# Patient Record
Sex: FEMALE | Race: WHITE
[De-identification: names, ages, dates, MRNs, and addresses within clinical notes are randomized per-mention and may not be internally consistent; named-entity substitution may affect disease eponyms.]

---

## 2017-02-20 ENCOUNTER — HOSPITAL ENCOUNTER (OUTPATIENT)
Dept: HOSPITAL 45 - C.PAPS | Age: 77
Discharge: HOME | End: 2017-02-20
Attending: OBSTETRICS & GYNECOLOGY
Payer: COMMERCIAL

## 2017-02-20 DIAGNOSIS — Z12.4: Primary | ICD-10-CM

## 2017-03-02 ENCOUNTER — HOSPITAL ENCOUNTER (OUTPATIENT)
Dept: HOSPITAL 45 - C.MAMM | Age: 77
Discharge: HOME | End: 2017-03-02
Attending: OBSTETRICS & GYNECOLOGY
Payer: COMMERCIAL

## 2017-03-02 DIAGNOSIS — Z12.31: Primary | ICD-10-CM

## 2017-03-02 NOTE — MAMMOGRAPHY REPORT
BILATERAL DIGITAL SCREENING MAMMOGRAM WITH CAD: 3/2/2017

CLINICAL HISTORY: Routine screening.  Patient has no complaints.  





TECHNIQUE:  Current study was also evaluated with a Computer Aided Detection (CAD) system.  Bilatera
l CC and MLO views were obtained.



COMPARISON: Comparison is made to exams dated:  2/29/2016 mammogram, 2/27/2015 mammogram, 2/26/2014 
mammogram, 2/25/2013 mammogram, 2/20/2012 mammogram, and 2/17/2011 mammogram - [facility].   



BREAST COMPOSITION:  There are scattered areas of fibroglandular density in both breasts.  



FINDINGS:  No suspicious masses, calcifications, or areas of architectural distortion are noted in e
ither breast. There has been no significant interval change compared to prior exams.  



IMPRESSION:  ACR BI-RADS CATEGORY 1: NEGATIVE

There is no mammographic evidence of malignancy. A 1 year screening mammogram is recommended.  The p
atient will receive written notification of the results.  





Approximately 10% of breast cancers are not detected with mammography. A negative mammographic repor
t should not delay biopsy if a clinically suggestive mass is present.



Alicja Morley M.D.          

ah/:3/2/2017 14:13:01  



Imaging Technologist: Rashida HAMEEDR, M, [facility]

letter sent: Normal 1/2  

BI-RADS Code: ACR BI-RADS Category 1: Negative

## 2017-03-30 ENCOUNTER — HOSPITAL ENCOUNTER (EMERGENCY)
Dept: HOSPITAL 45 - C.EDC | Age: 77
Discharge: HOME | End: 2017-03-30
Payer: COMMERCIAL

## 2017-03-30 VITALS — SYSTOLIC BLOOD PRESSURE: 137 MMHG | HEART RATE: 72 BPM | DIASTOLIC BLOOD PRESSURE: 73 MMHG | OXYGEN SATURATION: 100 %

## 2017-03-30 VITALS — TEMPERATURE: 97.88 F

## 2017-03-30 VITALS
HEIGHT: 65 IN | HEIGHT: 65 IN | WEIGHT: 121.25 LBS | WEIGHT: 121.25 LBS | BODY MASS INDEX: 20.2 KG/M2 | BODY MASS INDEX: 20.2 KG/M2

## 2017-03-30 DIAGNOSIS — R55: Primary | ICD-10-CM

## 2017-03-30 DIAGNOSIS — Z79.899: ICD-10-CM

## 2017-03-30 LAB
ALP SERPL-CCNC: 125 U/L (ref 45–117)
ALT SERPL-CCNC: 33 U/L (ref 12–78)
ANION GAP SERPL CALC-SCNC: 9 MMOL/L (ref 3–11)
APPEARANCE UR: CLEAR
AST SERPL-CCNC: 30 U/L (ref 15–37)
BASOPHILS # BLD: 0.08 K/UL (ref 0–0.2)
BASOPHILS NFR BLD: 1.4 %
BILIRUB UR-MCNC: (no result) MG/DL
BUN SERPL-MCNC: 12 MG/DL (ref 7–18)
BUN/CREAT SERPL: 13.8 (ref 10–20)
CALCIUM SERPL-MCNC: 9.5 MG/DL (ref 8.5–10.1)
CHLORIDE SERPL-SCNC: 104 MMOL/L (ref 98–107)
CKMB/CK RATIO: 1.7 (ref 0–3)
CO2 SERPL-SCNC: 29 MMOL/L (ref 21–32)
COLOR UR: YELLOW
COMPLETE: YES
CREAT CL PREDICTED SERPL C-G-VRATE: 46.7 ML/MIN
CREAT SERPL-MCNC: 0.89 MG/DL (ref 0.6–1.2)
EOSINOPHIL NFR BLD AUTO: 297 K/UL (ref 130–400)
GLUCOSE SERPL-MCNC: 109 MG/DL (ref 70–99)
HCT VFR BLD CALC: 43.4 % (ref 37–47)
IG%: 0.2 %
IMM GRANULOCYTES NFR BLD AUTO: 41.8 %
INR PPP: 1 (ref 0.9–1.1)
LYMPHOCYTES # BLD: 2.46 K/UL (ref 1.2–3.4)
MAGNESIUM SERPL-MCNC: 2.6 MG/DL (ref 1.8–2.4)
MANUAL MICROSCOPIC REQUIRED?: NO
MCH RBC QN AUTO: 30.4 PG (ref 25–34)
MCHC RBC AUTO-ENTMCNC: 32.3 G/DL (ref 32–36)
MCV RBC AUTO: 94.3 FL (ref 80–100)
MONOCYTES NFR BLD: 9.2 %
NEUTROPHILS # BLD AUTO: 7.1 %
NEUTROPHILS NFR BLD AUTO: 40.3 %
NITRITE UR QL STRIP: (no result)
PARTIAL THROMBOPLASTIN RATIO: 0.9
PH UR STRIP: 7 [PH] (ref 4.5–7.5)
PMV BLD AUTO: 10.1 FL (ref 7.4–10.4)
POTASSIUM SERPL-SCNC: 3.7 MMOL/L (ref 3.5–5.1)
PROTHROMBIN TIME: 10.7 SECONDS (ref 9–12)
RBC # BLD AUTO: 4.6 M/UL (ref 4.2–5.4)
REVIEW REQ?: NO
SODIUM SERPL-SCNC: 142 MMOL/L (ref 136–145)
SP GR UR STRIP: 1.01 (ref 1–1.03)
URINE BILL WITH OR WITHOUT MIC: (no result)
UROBILINOGEN UR-MCNC: (no result) MG/DL
WBC # BLD AUTO: 5.89 K/UL (ref 4.8–10.8)

## 2017-03-30 NOTE — DIAGNOSTIC IMAGING REPORT
CHEST ONE VIEW PORTABLE



CLINICAL HISTORY: EVALUATE WEAKNESS dyspnea



COMPARISON STUDY:  2/26/2016



FINDINGS: The bones soft tissues and hemidiaphragms are normal. The

cardiomediastinal silhouette is normal. The lungs are clear. The pulmonary

vasculature is normal. Mild stable emphysematous change



IMPRESSION:  Mild emphysematous change. No acute process. 









Electronically signed by:  Prateek Watson M.D.

3/30/2017 11:00 AM



Dictated Date/Time:  3/30/2017 11:00 AM

## 2017-03-30 NOTE — DIAGNOSTIC IMAGING REPORT
HEAD CT NONCONTRAST



CT DOSE: 638.56 mGycm



HISTORY: Mental status change  syncope



TECHNIQUE: Multiaxial CT images of the head were performed without the use of

intravenous contrast.



Comparison: None.



Findings: The paranasal sinuses and mastoid air cells are clear. The calvarium

and skull base are intact. The ventricles and sulci are within normal limits.

There is no mass, hematoma, midline shift, or acute infarct.



Impression:

No acute intracranial abnormality. 







Electronically signed by:  Prateek Watson M.D.

3/30/2017 11:30 AM



Dictated Date/Time:  3/30/2017 11:29 AM

## 2017-03-30 NOTE — EMERGENCY ROOM VISIT NOTE
History


Report prepared by Charito:  Jessy Wright


Under the Supervision of:  Dr. Jeovany Krueger M.D.


First contact with patient:  10:40


Stated Complaint:  SYNCOPE





History of Present Illness


The patient is a 77 year old female who presents to the Emergency Room with 

complaints of a sudden syncopal episode that occurred prior to arrival.  The 

patient states that she was at the [employer] this morning when she started to 

feel that she needed to have a bowel movement.  She states that she had a one 

minute warning and states that she lost consciousness.  The patient states that 

she was told that she was unconscious for 20 seconds.  She states that she woke 

this morning feeling lightheaded.  The patient states that she felt nauseous 

after the episode, but states that it has now resolved.  She notes weakness to 

her extremities since the episode.  The patient states that three weeks ago she 

had an episode of vertigo.  Pt denies, headache, fevers, chills, diaphoresis, 

visual changes, neck pain, tearing pain radiating to the back, personal history 

or family history of aneurysm or pulmonary embolism, uncontrolled hypertension, 

breathing difficulties, leg swelling, coagulation abnormalities, prolonged 

travel, recent surgery or immobilization, vomiting, abdominal pain, melena, 

hematochezia, urinary symptoms, numbness, lymphadenopathy, rash, or other 

complaints.





   Source of History:  patient


   Onset:  prior to arrival


   Position:  other (global)


   Quality:  other (syncopal episode)


   Timing:  other (sudden)


   Associated Symptoms:  + LOC, + nausea


Note:


Associated Symptoms: lightheadedness





Review of Systems


See HPI for pertinent positives and negatives.  A total of ten systems were 

reviewed and were otherwise negative.





Past Medical & Surgical


Medical Problems:


(1) Acute pancreatitis


(2) Bullous pemphigoid


(3) Clostridium difficile colitis


(4) Diverticulosis Colon (W/O Ment Of Hemorrhage)


(5) Nontox Multinodul Goiter


(6) Right Hip DJD








Family History





No pertinent family history





Social History


Smoking Status:  Never Smoker


Marital Status:  


Housing Status:  lives with family





Current/Historical Medications


Scheduled


Pancrelipase (Lipase-Protease- (Creon 89016), 1 CAP PO BID


Rizatriptan Benzoate (Maxalt), 10 MG PO PRN





Scheduled PRN


Docusate Sodium (Docusate Sodium), 1 CAP PO QAM PRN for PRN





Allergies


Coded Allergies:  


     Doxycycline (Verified  Allergy, Unknown, DEVELOPED PANCREATITIS, 3/30/17)


     Clindamycin (Verified  Adverse Reaction, Severe, unkown, 3/30/17)


 pt states causes c-diff


     Pregabalin (Verified  Adverse Reaction, Intermediate, Hallucinations, 3/30/

17)





Physical Exam


Vital Signs











  Date Time  Temp Pulse Resp B/P Pulse Ox O2 Delivery O2 Flow Rate FiO2


 


3/30/17 14:30  72 18 137/73 100 Room Air  


 


3/30/17 13:28  64      


 


3/30/17 12:31  70 18 165/80 98 Room Air  


 


3/30/17 11:37  74  134/75    





  78  117/67    





  77  121/74    


 


3/30/17 10:44 36.6 75 18 123/71 99 Room Air  


 


3/30/17 10:44  72      











Physical Exam


GENERAL: Awake, alert, well-appearing, in no distress


HENT: Normocephalic, atraumatic. Oropharynx unremarkable.


EYES: Normal conjunctiva. Sclera non-icteric.


NECK: Supple. No nuchal rigidity. FROM. No JVD.


RESPIRATORY: Clear to auscultation.


CARDIAC: Regular rate, normal rhythm. Extremities warm and well perfused. 

Pulses equal.


ABDOMEN: Soft, non-distended. No tenderness to palpation. No rebound or 

guarding. No masses.


RECTAL: Deferred.


MUSCULOSKELETAL: Chest examination reveals no tenderness. The back is 

symmetrical on inspection without obvious abnormality. There is no CVA 

tenderness to palpation. No joint edema. 


LOWER EXTREMITIES: Calves are equal size bilaterally and non-tender. No edema. 

No discoloration. 


NEURO: Normal sensorium. No sensory or motor deficits noted. Cranial nerves 

intact.


SKIN: No rash or jaundice noted.





Medical Decision & Procedures


ER Provider


Diagnostic Interpretation:


X ray results as stated below per my interpretation and radiologist 

interpretation. Other radiology results as stated below per my review and 

radiologist interpretation





CHEST ONE VIEW PORTABLE





CLINICAL HISTORY: EVALUATE WEAKNESS dyspnea





COMPARISON STUDY:  2/26/2016





FINDINGS: The bones soft tissues and hemidiaphragms are normal. The


cardiomediastinal silhouette is normal. The lungs are clear. The pulmonary


vasculature is normal. Mild stable emphysematous change





IMPRESSION:  Mild emphysematous change. No acute process. 





Electronically signed by:  Prateek Watson M.D.


3/30/2017 11:00 AM





Dictated Date/Time:  3/30/2017 11:00 AM





HEAD CT NONCONTRAST





CT DOSE: 638.56 mGycm





HISTORY: Mental status change  syncope





TECHNIQUE: Multiaxial CT images of the head were performed without the use of


intravenous contrast.





Comparison: None.





Findings: The paranasal sinuses and mastoid air cells are clear. The calvarium


and skull base are intact. The ventricles and sulci are within normal limits.


There is no mass, hematoma, midline shift, or acute infarct.





Impression:


No acute intracranial abnormality. 





Electronically signed by:  Prateek Watson M.D.


3/30/2017 11:30 AM





Dictated Date/Time:  3/30/2017 11:29 AM





Laboratory Results


3/30/17 10:14








Red Blood Count 4.60, Mean Corpuscular Volume 94.3, Mean Corpuscular Hemoglobin 

30.4, Mean Corpuscular Hemoglobin Concent 32.3, Mean Platelet Volume 10.1, 

Neutrophils (%) (Auto) 40.3, Lymphocytes (%) (Auto) 41.8, Monocytes (%) (Auto) 

9.2, Eosinophils (%) (Auto) 7.1, Basophils (%) (Auto) 1.4, Neutrophils # (Auto) 

2.38, Lymphocytes # (Auto) 2.46, Monocytes # (Auto) 0.54, Eosinophils # (Auto) 

0.42, Basophils # (Auto) 0.08





3/30/17 10:14

















Test


  3/30/17


10:14 3/30/17


13:00


 


White Blood Count


  5.89 K/uL


(4.8-10.8) 


 


 


Red Blood Count


  4.60 M/uL


(4.2-5.4) 


 


 


Hemoglobin


  14.0 g/dL


(12.0-16.0) 


 


 


Hematocrit 43.4 % (37-47)  


 


Mean Corpuscular Volume


  94.3 fL


() 


 


 


Mean Corpuscular Hemoglobin


  30.4 pg


(25-34) 


 


 


Mean Corpuscular Hemoglobin


Concent 32.3 g/dl


(32-36) 


 


 


Platelet Count


  297 K/uL


(130-400) 


 


 


Mean Platelet Volume


  10.1 fL


(7.4-10.4) 


 


 


Neutrophils (%) (Auto) 40.3 %  


 


Lymphocytes (%) (Auto) 41.8 %  


 


Monocytes (%) (Auto) 9.2 %  


 


Eosinophils (%) (Auto) 7.1 %  


 


Basophils (%) (Auto) 1.4 %  


 


Neutrophils # (Auto)


  2.38 K/uL


(1.4-6.5) 


 


 


Lymphocytes # (Auto)


  2.46 K/uL


(1.2-3.4) 


 


 


Monocytes # (Auto)


  0.54 K/uL


(0.11-0.59) 


 


 


Eosinophils # (Auto)


  0.42 K/uL


(0-0.5) 


 


 


Basophils # (Auto)


  0.08 K/uL


(0-0.2) 


 


 


RDW Standard Deviation


  45.0 fL


(36.4-46.3) 


 


 


RDW Coefficient of Variation


  13.0 %


(11.5-14.5) 


 


 


Immature Granulocyte % (Auto) 0.2 %  


 


Immature Granulocyte # (Auto)


  0.01 K/uL


(0.00-0.02) 


 


 


Prothrombin Time


  10.7 SECONDS


(9.0-12.0) 


 


 


Prothromb Time International


Ratio 1.0 (0.9-1.1) 


  


 


 


Activated Partial


Thromboplast Time 22.6 SECONDS


(21.0-31.0) 


 


 


Partial Thromboplastin Ratio 0.9  


 


Anion Gap


  9.0 mmol/L


(3-11) 


 


 


Est Creatinine Clear Calc


Drug Dose 46.7 ml/min 


  


 


 


Estimated GFR (


American) 73.0 


  


 


 


Estimated GFR (Non-


American 63.0 


  


 


 


BUN/Creatinine Ratio 13.8 (10-20)  


 


Calcium Level


  9.5 mg/dl


(8.5-10.1) 


 


 


Magnesium Level


  2.6 mg/dl


(1.8-2.4) 


 


 


Total Bilirubin


  0.6 mg/dl


(0.2-1) 


 


 


Direct Bilirubin


  0.1 mg/dl


(0-0.2) 


 


 


Aspartate Amino Transf


(AST/SGOT) 30 U/L (15-37) 


  


 


 


Alanine Aminotransferase


(ALT/SGPT) 33 U/L (12-78) 


  


 


 


Alkaline Phosphatase


  125 U/L


() 


 


 


Total Creatine Kinase


  59 U/L


() 


 


 


Creatine Kinase MB


  1.0 ng/ml


(0.5-3.6) 


 


 


Creatine Kinase MB Ratio 1.7 (0-3.0)  


 


Troponin I


  < 0.015 ng/ml


(0-0.045) 


 


 


Total Protein


  7.0 gm/dl


(6.4-8.2) 


 


 


Albumin


  3.6 gm/dl


(3.4-5.0) 


 


 


Thyroid Stimulating Hormone


(TSH) 8.830 uIu/ml


(0.300-4.500) 


 


 


Urine Color  YELLOW 


 


Urine Appearance  CLEAR (CLEAR) 


 


Urine pH  7.0 (4.5-7.5) 


 


Urine Specific Gravity


  


  1.009


(1.000-1.030)


 


Urine Protein  NEG (NEG) 


 


Urine Glucose (UA)  NEG (NEG) 


 


Urine Ketones  NEG (NEG) 


 


Urine Occult Blood  NEG (NEG) 


 


Urine Nitrite  NEG (NEG) 


 


Urine Bilirubin  NEG (NEG) 


 


Urine Urobilinogen  NEG (NEG) 


 


Urine Leukocyte Esterase  NEG (NEG) 








Laboratory results reviewed by me





Medications Administered











 Medications


  (Trade)  Dose


 Ordered  Sig/Lissa


 Route  Start Time


 Stop Time Status Last Admin


Dose Admin


 


 Sodium Chloride  1,000 ml @ 


 125 mls/hr  Q8H STAT


 IV  3/30/17 10:40


 3/30/17 15:06 DC 3/30/17 10:40


125 MLS/HR


 


 Sodium Chloride


  (Nss 500ml)  500 ml @ 


 999 mls/hr  Q31M STAT


 IV  3/30/17 10:47


 3/30/17 11:17 DC 3/30/17 10:47


999 MLS/HR











ECG


Indication:  syncope


Rate (beats per minute):  69


Rhythm:  normal sinus


Findings:  no acute ischemic change, no ectopy





ED Course


1040: Ordered Sodium Chloride 1000 ml @ 125 mls/hr IV.





1043: The patient was evaluated in room C6. A complete history and physical 

exam was performed.





1047: Ordered Sodium Chloride 500 ml @ 999 mls/hr IV.





1352: I reevaluated the patient and she is currently asymptomatic.  I discussed 

all the exam findings with her.  She states that she has an appointment 

scheduled next week with her PCP.  She is going to have an ambulatory trial.





1415: Per nursing staff, the patient's ambulatory trial went well.  





1421: I reevaluated the patient and she is doing well.  I discussed the 

treatment plan with her and she verbalized complete understanding and 

agreement.  She is ready to go home.





Medical Decision


Prior records/ancillary studies reviewed.





Triage Nursing notes reviewed and agree them.





Additional history obtained from the family.





The patient's history was concerning for syncope.





Differential diagnosis:


Etiologies such as vasovagal event, infection, hypoglycemia, electrolyte 

abnormalities, cardiac sources, intracerebral event, toxicologic, neurologic, 

as well as others were entertained.  





Physical examination:


As above.  No focal findings.





ER treatment provided:


IV hydration with normal saline


On reassessment the patient felt completely better.





Diagnostics interpretation by me:


ECG: Unremarkable





The labs revealed the patient had an unremarkable CBC, chemistry panel, 

urinalysis and cardiac markers.  Her TSH is minimally elevated.  No findings 

that would be a cause of her episode.  





Imaging studies:


Chest x-ray and head CT as above





The patient notes a sensation about having a bowel movement and having some 

abdominal discomfort.  She then became lightheaded.  The patient then noted 

increasing symptoms to the point where she became even more lightheaded and 

passed out.  She woke up.  She has some mildly low blood pressure and some 

nausea afterwards.  She then felt completely normal.  She feels completely 

normal at this point time.  Orthostatic testing was negative.  Blood work and 

urinalysis were unremarkable.  The patient ambulated and felt great.  I 

discussed management options and the patient felt very comfortable with 

conservative management.  She has a follow-up appointment with her primary 

doctor next week.  She will rest and take it easy.  If she worsens in any way 

she will come back to the emergency from for reevaluation.  This seems to be 

most consistent with a vasovagal event. 





By the evaluation outlined above emergent etiologies such as  infection, 

hypoglycemia, electrolyte abnormalities, cardiac sources, intracerebral event, 

toxicologic, neurologic,as well as others were deemed relatively unlikely.  





The patient and  were informed about the findings as listed above.  All 

questions were answered and they were pleased with the treatment.  Return 

instructions were outlined and the patient was discharged in stable condition. 

   





Outpatient prescription management:


None





Referral:


The patient was referred back to her primary care physician for follow-up next 

week for a recheck of the current condition.











The chart was completed utilizing Dragon Speech voice recognition software.   

Grammatical errors, random word insertions, pronoun errors, and incomplete 

sentences are an occasional consequence of this system due to software 

limitations, ambient noise, and hardware issues.  Any formal questions or 

concerns about the content, text, or information contained within the body of 

this dictation should be directly addressed to the physician for clarification.





Impression





 Primary Impression:  


 Syncope





Scribe Attestation


The scribe's documentation has been prepared under my direction and personally 

reviewed by me in its entirety. I confirm that the note above accurately 

reflects all work, treatment, procedures, and medical decision making performed 

by me.





Departure Information


Dispostion


Home / Self-Care





Referrals


Pete De La Rosa M.D. (PCP)





Forms


HOME CARE DOCUMENTATION FORM,                                                 

               IMPORTANT VISIT INFORMATION





Patient Instructions


My [facility]





Additional Instructions





SYNCOPE INSTRUCTIONS:





Rest and drink plenty of fluids as tolerated.  


 


Continue current medications.





Return to the ER for passing out, chest pain, headache, persistent vomiting, 

fevers, abdominal pain, chest pains, difficulty breathing, black or bloody 

stools, worsening of your condition, or as needed.





Follow up with your primary physician as scheduled next week for a recheck of 

your current condition





Problem Qualifiers








 Primary Impression:  


 Syncope


 Syncope type:  vasovagal syncope  Qualified Codes:  R55 - Syncope and collapse

## 2017-09-06 ENCOUNTER — HOSPITAL ENCOUNTER (OUTPATIENT)
Dept: HOSPITAL 45 - X.SURG | Age: 77
Discharge: HOME | End: 2017-09-06
Attending: SPECIALIST
Payer: COMMERCIAL

## 2017-09-06 VITALS — TEMPERATURE: 98.06 F

## 2017-09-06 VITALS
WEIGHT: 110.23 LBS | WEIGHT: 110.23 LBS | HEIGHT: 65.98 IN | BODY MASS INDEX: 17.72 KG/M2 | BODY MASS INDEX: 17.72 KG/M2 | HEIGHT: 65.98 IN

## 2017-09-06 VITALS — HEART RATE: 67 BPM | SYSTOLIC BLOOD PRESSURE: 121 MMHG | OXYGEN SATURATION: 97 % | DIASTOLIC BLOOD PRESSURE: 67 MMHG

## 2017-09-06 DIAGNOSIS — H25.12: Primary | ICD-10-CM

## 2017-09-06 RX ADMIN — CYCLOPENTOLATE HYDROCHLORIDE SCH DROP: 10 SOLUTION/ DROPS OPHTHALMIC at 10:45

## 2017-09-06 RX ADMIN — TROPICAMIDE SCH DROP: 10 SOLUTION/ DROPS OPHTHALMIC at 10:45

## 2017-09-06 RX ADMIN — PHENYLEPHRINE HYDROCHLORIDE SCH DROP: 25 SOLUTION/ DROPS OPHTHALMIC at 10:44

## 2017-09-06 RX ADMIN — PHENYLEPHRINE HYDROCHLORIDE SCH DROP: 25 SOLUTION/ DROPS OPHTHALMIC at 10:28

## 2017-09-06 RX ADMIN — MOXIFLOXACIN HYDROCHLORIDE SCH DROP: 5 SOLUTION/ DROPS OPHTHALMIC at 10:39

## 2017-09-06 RX ADMIN — TROPICAMIDE SCH DROP: 10 SOLUTION/ DROPS OPHTHALMIC at 10:29

## 2017-09-06 RX ADMIN — CYCLOPENTOLATE HYDROCHLORIDE SCH DROP: 10 SOLUTION/ DROPS OPHTHALMIC at 10:29

## 2017-09-06 RX ADMIN — MOXIFLOXACIN HYDROCHLORIDE SCH DROP: 5 SOLUTION/ DROPS OPHTHALMIC at 10:46

## 2017-09-06 NOTE — [facility] OPERATIVE REPORT
Operative Report


Date of Service


Sep 6, 2017.





Operative Report


1.  PREOPERATIVE DIAGNOSIS:  Senile nuclear cataract, left eye.  





2.  POSTOPERATIVE DIAGNOSIS:  Senile nuclear cataract, left eye.  





3.  PROCEDURE:  Phacoemulsification of left cataract with posterior chamber 

lens implant, type [employer], model MI60L, power +19.0 diopters. 





ANESTHESIA:   Local standby.  SURGEON:  Dr. Sears. COMPLICATIONS:  None.  

OPERATING TIME:  10 minutes.  





4.  OPERATION AND FINDINGS: 





DESCRIPTION OF PROCEDURE:  The left pupil was dilated.  The anesthetic was 

administered using a topical technique.  The left eye was prepped and draped.  

A speculum was placed.  A clear corneal incision was formed.  The chamber was 

filled with Amvisc Plus and Endocoat.  Epinephrine solution was used.  A 

paracentesis was placed.  A capsulorrhexis was performed.  The nucleus was 

hydrodissected.  A dense lens was removed with phacoemulsification.  Time was 

3.64 seconds.  The aspiration unit was used to remove the cortex.  The capsule 

was filled with Amvisc Plus.  The lens implant was folded and placed into the 

capsule.  The incision was hydrated.  The Amvisc was aspirated.  The wound was 

secure.  The chamber was deep.  The pupil was round.  Brimonidine, TobraDex 

ointment and Vigamox solution were placed.  The speculum was removed.  The 

patient was returned to the Recovery Room in stable condition.


I attest to the content of the Intraoperative Record and any orders documented 

therein.  Any exceptions are noted below.


The scribe's documentation has been prepared in my presence, under my direction 

and personally reviewed by me in its entirety. I confirm that the note above 

accurately reflects all work, treatment, procedures, and medical decision 

making performed by me.








I personally scribed for Fabien Sears M.D. ([email or web address]) on 9/6/17 at 11:26.  

Electronically submitted by Jaylyn Ni ([email or web address]).

## 2017-09-06 NOTE — ANESTHESIA PROGRESS NT - [facility]
Anesthesia Post Op Note


Date & Time


Sep 6, 2017 at 11:31





Vital Signs


Pain Intensity:  0





Vital Signs Past 12 Hours








  Date Time  Temp Pulse Resp B/P (MAP) Pulse Ox O2 Delivery O2 Flow Rate FiO2


 


9/6/17 10:03 37.0 80 18 133/80 (97) 95 Room Air  











Notes


Mental Status:  alert / awake / arousable, participated in evaluation


Pt Amnestic to Procedure:  Yes


Nausea / Vomiting:  adequately controlled


Pain:  adequately controlled


Airway Patency, RR, SpO2:  stable & adequate


BP & HR:  stable & adequate


Hydration State:  stable & adequate


Anesthetic Complications:  no major complications apparent

## 2017-09-06 NOTE — DISCHARGE INSTRUCTIONS-[facility]
Discharge Instructions


Date of Service


Sep 6, 2017.





Visit


Reason for Visit:  Left Cataract





Discharge


Discharge Diagnosis / Problem:  lens implant left eye





Discharge Goals


Goal(s):  Improve function





Activity Recommendations


Activity Limitations:  resume your previous activity


Lifting Limitations:  no more than 10 pounds


Exercise/Sports Limitations:  gradually increase as tolerated


May Resume Sexual Activity:  when tolerated


Shower/Bathe:  tomorrow


Driving or Machine Use:  resume 1 day after discharge





Anesthesia


.





Post Anesthesia Instructions:





If you have had General Anesthesia or IV Sedation:





*  Do not drive today.


*  Resume driving when surgeon permits.


*  Do not make important decisions or sign legal documents today.


*  Call surgeon for:





   1.  Temperature elevations greater than 101 degrees F.


   2.  Uncontrollable pain.


   3.  Excessive bleeding.


   4.  Persistent nausea and vomiting.


   5.  Medication intolerance (nausea, vomiting or rash).





*  For nausea and vomiting use only clear liquids such as: tea, soda, bouillon 

until nausea subsides, then gradually increase diet as tolerated.





*  If you have any concerns or questions, call your surgeon's office.  If 

physician is unavailable and it is an emergency, call 911 or go to the nearest 

emergency room.





.





Instructions / Follow-Up


Instructions / Follow-Up





ACTIVITY RECOMMENDATIONS:





*  Light activities.





*  Mild irritation and blurred vision are common for the first few days.





*  You may walk outside, read, watch television.





*  Redness around the white part of the eye is common.








MEDICATIONS:





Resume previous medications unless instructed otherwise by your surgeon.





*  Take white Diamox (Acetazolamide) tablet at 1 pm today.





Start all eye drops at 1 pm today:





*  Eye drops (today and tomorrow):


   Durezol - one drop in operative eye every 3 hours while awake


            Ofloxacin - one drop in operative eye every  3 hours while awake


   





SPECIAL CARE INSTRUCTIONS:





*  Tape plastic shield over eye to sleep at night.  





Call your doctor at [phone] with any concerns or problems.








FOLLOW UP VISIT:





Follow-up with Dr Sears at [facility] office as scheduled.





Diet Recommendations


Home Diet:  no limitations





Procedures


Procedures Performed:  


cataract extraction with lens implant





Pending Studies


Studies pending at discharge:  no





Medical Emergencies








.


Who to Call and When:





Medical Emergencies:  If at any time you feel your situation is an emergency, 

please call 911 immediately.





.





Non-Emergent Contact


Non-Emergency issues call your:  Ophthalmologist


Call Non-Emergent contact if:  your pain is not controlled


[phone]


.


.








"Provider Documentation" section prepared by Fabien Sears.








.

## 2017-09-20 ENCOUNTER — HOSPITAL ENCOUNTER (OUTPATIENT)
Dept: HOSPITAL 45 - X.SURG | Age: 77
Discharge: HOME | End: 2017-09-20
Attending: SPECIALIST
Payer: COMMERCIAL

## 2017-09-20 VITALS — HEART RATE: 66 BPM | DIASTOLIC BLOOD PRESSURE: 75 MMHG | SYSTOLIC BLOOD PRESSURE: 120 MMHG | OXYGEN SATURATION: 96 %

## 2017-09-20 VITALS
HEIGHT: 65.98 IN | BODY MASS INDEX: 17.72 KG/M2 | HEIGHT: 65.98 IN | WEIGHT: 110.23 LBS | WEIGHT: 110.23 LBS | BODY MASS INDEX: 17.72 KG/M2

## 2017-09-20 VITALS — TEMPERATURE: 97.88 F

## 2017-09-20 DIAGNOSIS — H25.11: Primary | ICD-10-CM

## 2017-09-20 RX ADMIN — TROPICAMIDE SCH DROP: 10 SOLUTION/ DROPS OPHTHALMIC at 07:34

## 2017-09-20 RX ADMIN — TROPICAMIDE SCH DROP: 10 SOLUTION/ DROPS OPHTHALMIC at 07:38

## 2017-09-20 RX ADMIN — MOXIFLOXACIN HYDROCHLORIDE SCH DROP: 5 SOLUTION/ DROPS OPHTHALMIC at 07:36

## 2017-09-20 RX ADMIN — CYCLOPENTOLATE HYDROCHLORIDE SCH DROP: 10 SOLUTION/ DROPS OPHTHALMIC at 07:40

## 2017-09-20 RX ADMIN — CYCLOPENTOLATE HYDROCHLORIDE SCH DROP: 10 SOLUTION/ DROPS OPHTHALMIC at 07:35

## 2017-09-20 RX ADMIN — PHENYLEPHRINE HYDROCHLORIDE SCH DROP: 25 SOLUTION/ DROPS OPHTHALMIC at 07:37

## 2017-09-20 RX ADMIN — MOXIFLOXACIN HYDROCHLORIDE SCH DROP: 5 SOLUTION/ DROPS OPHTHALMIC at 07:45

## 2017-09-20 RX ADMIN — PHENYLEPHRINE HYDROCHLORIDE SCH DROP: 25 SOLUTION/ DROPS OPHTHALMIC at 07:33

## 2017-09-20 NOTE — DISCHARGE INSTRUCTIONS-[facility]
Discharge Instructions


Date of Service


Sep 20, 2017.





Visit


Reason for Visit:  Cataract Right Eye





Discharge


Discharge Diagnosis / Problem:  lens implant right eye





Discharge Goals


Goal(s):  Improve function





Medications


Stopped Medications Name(s):  


Patient last took medications yesterday





Activity Recommendations


Activity Limitations:  resume your previous activity


Lifting Limitations:  no more than 10 pounds


Exercise/Sports Limitations:  gradually increase as tolerated


May Resume Sexual Activity:  when tolerated


Shower/Bathe:  tomorrow


Driving or Machine Use:  resume 1 day after discharge





Anesthesia


.





Post Anesthesia Instructions:





If you have had General Anesthesia or IV Sedation:





*  Do not drive today.


*  Resume driving when surgeon permits.


*  Do not make important decisions or sign legal documents today.


*  Call surgeon for:





   1.  Temperature elevations greater than 101 degrees F.


   2.  Uncontrollable pain.


   3.  Excessive bleeding.


   4.  Persistent nausea and vomiting.


   5.  Medication intolerance (nausea, vomiting or rash).





*  For nausea and vomiting use only clear liquids such as: tea, soda, bouillon 

until nausea subsides, then gradually increase diet as tolerated.





*  If you have any concerns or questions, call your surgeon's office.  If 

physician is unavailable and it is an emergency, call 911 or go to the nearest 

emergency room.





.





Instructions / Follow-Up


Instructions / Follow-Up





ACTIVITY RECOMMENDATIONS:





*  Light activities.





*  Mild irritation and blurred vision are common for the first few days.





*  You may walk outside, read, watch television.





*  Redness around the white part of the eye is common.








MEDICATIONS:





Resume previous medications unless instructed otherwise by your surgeon.





*  Take white Diamox (Acetazolamide) tablet at 1 pm today.





Start all eye drops at 1 pm today:





*  Eye drops (today and tomorrow):


   Durezol - one drop in operative eye every 3 hours while awake


            Ofloxacin - one drop in operative eye every  3 hours while awake


   


SPECIAL CARE INSTRUCTIONS:





*  Tape plastic shield over eye to sleep at night.  





Call your doctor at [phone] with any concerns or problems.








FOLLOW UP VISIT:





Follow-up with Dr Sears at [place] office as scheduled.





Diet Recommendations


Home Diet:  no limitations





Procedures


Procedures Performed:  


Right Cataract Phacoemulsification With Intraocular Lens Implant





Pending Studies


Studies pending at discharge:  no





Medical Emergencies








.


Who to Call and When:





Medical Emergencies:  If at any time you feel your situation is an emergency, 

please call 911 immediately.





.





Non-Emergent Contact


Non-Emergency issues call your:  Ophthalmologist


Call Non-Emergent contact if:  your pain is not controlled


[phone]


.


.








"Provider Documentation" section prepared by Fabien Sears.








.

## 2017-09-20 NOTE — [facility] OPERATIVE REPORT
Operative Report


Date of Service


Sep 20, 2017.





Operative Report


1.  PREOPERATIVE DIAGNOSIS:  Senile nuclear cataract, right eye.  





2.  POSTOPERATIVE DIAGNOSIS:  Senile nuclear cataract, right eye.  





3.  PROCEDURE:  Phacoemulsification of right cataract with posterior chamber 

lens implant, type [employer], model [id number], power +21.0 diopters. 





ANESTHESIA:   Local standby.  SURGEON:  Dr. Sears. COMPLICATIONS:  None.  

OPERATING TIME:  10 minutes.  





4.  OPERATION AND FINDINGS: 





DESCRIPTION OF PROCEDURE:  The right pupil was dilated.  The anesthetic was 

administered using a topical technique.  The right eye was prepped and draped.  

A speculum was placed.  A clear corneal incision was formed.  The chamber was 

filled with Amvisc Plus and Endocoat.  Epinephrine solution was used.  A 

paracentesis was placed.  A capsulorrhexis was performed.  The nucleus was 

hydrodissected.  The lens was removed with phacoemulsification.  Time was 3.36 

seconds.  The aspiration unit was used to remove the cortex.  The capsule was 

filled with Amvisc Plus.  The lens implant was folded and placed into the 

capsule.  The incision was hydrated.  The Amvisc was aspirated.  The wound was 

secure.  The chamber was deep.  The pupil was round.  Brimonidine, TobraDex 

ointment and Vigamox solution were placed.  The speculum was removed.  The 

patient was returned to the Recovery Room in stable condition.


I attest to the content of the Intraoperative Record and any orders documented 

therein.  Any exceptions are noted below.


The scribe's documentation has been prepared in my presence, under my direction 

and personally reviewed by me in its entirety. I confirm that the note above 

accurately reflects all work, treatment, procedures, and medical decision 

making performed by me.








I personally scribed for Fabien Sears M.D. ([email or web address]) on 9/20/17 at 08:29.  

Electronically submitted by Jaylyn Ni ([email or web address]).

## 2017-09-20 NOTE — ANESTHESIA PROGRESS NT - [facility]
Anesthesia Post Op Note


Date & Time


Sep 20, 2017 at 08:44





Vital Signs


Pain Intensity:  0





Vital Signs Past 12 Hours








  Date Time  Temp Pulse Resp B/P (MAP) Pulse Ox O2 Delivery O2 Flow Rate FiO2


 


9/20/17 08:30 36.6 59 18 109/65 (80) 95 Room Air  


 


9/20/17 07:42    120/75 (90)    


 


9/20/17 07:23 36.5 75 18 144/81 (102) 98 Room Air  











Notes


Mental Status:  alert / awake / arousable, participated in evaluation


Pt Amnestic to Procedure:  Yes


Nausea / Vomiting:  adequately controlled


Pain:  adequately controlled


Airway Patency, RR, SpO2:  stable & adequate


BP & HR:  stable & adequate


Hydration State:  stable & adequate


Anesthetic Complications:  no major complications apparent

## 2017-12-09 ENCOUNTER — HOSPITAL ENCOUNTER (EMERGENCY)
Dept: HOSPITAL 45 - C.EDB | Age: 77
Discharge: HOME | End: 2017-12-09
Payer: COMMERCIAL

## 2017-12-09 VITALS
HEART RATE: 89 BPM | TEMPERATURE: 98.24 F | DIASTOLIC BLOOD PRESSURE: 73 MMHG | OXYGEN SATURATION: 96 % | SYSTOLIC BLOOD PRESSURE: 114 MMHG

## 2017-12-09 VITALS
BODY MASS INDEX: 17.79 KG/M2 | BODY MASS INDEX: 17.79 KG/M2 | WEIGHT: 110.67 LBS | WEIGHT: 110.67 LBS | HEIGHT: 65.98 IN | HEIGHT: 65.98 IN

## 2017-12-09 VITALS — OXYGEN SATURATION: 95 %

## 2017-12-09 DIAGNOSIS — J40: Primary | ICD-10-CM

## 2017-12-09 DIAGNOSIS — Z79.52: ICD-10-CM

## 2017-12-09 LAB
ALBUMIN/GLOB SERPL: 0.9 {RATIO} (ref 0.9–2)
ALP SERPL-CCNC: 156 U/L (ref 45–117)
ALT SERPL-CCNC: 28 U/L (ref 12–78)
ANION GAP SERPL CALC-SCNC: 1 MMOL/L (ref 3–11)
AST SERPL-CCNC: 23 U/L (ref 15–37)
BASOPHILS # BLD: 0.06 K/UL (ref 0–0.2)
BASOPHILS NFR BLD: 0.8 %
BUN SERPL-MCNC: 15 MG/DL (ref 7–18)
BUN/CREAT SERPL: 15.6 (ref 10–20)
CALCIUM SERPL-MCNC: 9.3 MG/DL (ref 8.5–10.1)
CHLORIDE SERPL-SCNC: 109 MMOL/L (ref 98–107)
CO2 SERPL-SCNC: 30 MMOL/L (ref 21–32)
COMPLETE: YES
CREAT CL PREDICTED SERPL C-G-VRATE: 38.7 ML/MIN
CREAT SERPL-MCNC: 0.98 MG/DL (ref 0.6–1.2)
EOSINOPHIL NFR BLD AUTO: 246 K/UL (ref 130–400)
GLOBULIN SER-MCNC: 3.9 GM/DL (ref 2.5–4)
GLUCOSE SERPL-MCNC: 116 MG/DL (ref 70–99)
HCT VFR BLD CALC: 47.1 % (ref 37–47)
IG%: 0.1 %
IMM GRANULOCYTES NFR BLD AUTO: 21.1 %
INR PPP: 1 (ref 0.9–1.1)
LYMPHOCYTES # BLD: 1.5 K/UL (ref 1.2–3.4)
MCH RBC QN AUTO: 32.2 PG (ref 25–34)
MCHC RBC AUTO-ENTMCNC: 32.7 G/DL (ref 32–36)
MCV RBC AUTO: 98.3 FL (ref 80–100)
MONOCYTES NFR BLD: 5.5 %
NEUTROPHILS # BLD AUTO: 11.5 %
NEUTROPHILS NFR BLD AUTO: 61 %
PARTIAL THROMBOPLASTIN RATIO: 1
PMV BLD AUTO: 10.2 FL (ref 7.4–10.4)
POTASSIUM SERPL-SCNC: 4.1 MMOL/L (ref 3.5–5.1)
PROTHROMBIN TIME: 10.1 SECONDS (ref 9–12)
RBC # BLD AUTO: 4.79 M/UL (ref 4.2–5.4)
SODIUM SERPL-SCNC: 140 MMOL/L (ref 136–145)
WBC # BLD AUTO: 7.12 K/UL (ref 4.8–10.8)

## 2017-12-09 NOTE — EMERGENCY ROOM VISIT NOTE
History


Report prepared by Charito:  Tere Carnes


Under the Supervision of:  Dr. Fabien Cervantes D.O.


First contact with patient:  17:07


Chief Complaint:  RESPIRATORY PROBLEMS


Stated Complaint:  HYPERVENTILATING


Nursing Triage Summary:  


pt has had periods of hyperventilation over past 6 weeks 





saw pcp on Thursday had chest xray and started on inhaler 





pt initially reported anxiety, pt is not hyperventilating on arrival to ER





History of Present Illness


The patient is a 77 year old female who presents to the Emergency Room with 

complaints of intermittent difficulty breathing beginning about a month and a 

half ago. The patient states her breathing worsens with exertion. She states "I 

don't think it's shortness of breath, I think I am hyperventilating". The 

patient saw her PCP three weeks ago for a constant cough and was put on an 

antibiotic. She states the antibiotic helped her cough. The patient saw her PCP 

three days ago, and had chest x-ray which showed mild emphysema but was 

otherwise unremarkable. She was started on an inhaler which has given her 

minimal relief. She denies any chest pain. The patient denies any recent travel 

or surgeries. The patient is not a smoker. She has a follow up appointment with 

Dr. Merino in a week. The patient has a history of anxiety.





   Source of History:  patient


   Onset:  a month ago


   Position:  other (global)


   Timing:  intermittent


   Modifying Factors (Worsening):  exertion


   Modifying Factors (Relieving):  other (inhaler)


   Associated Symptoms:  + cough, No chest pain





Review of Systems


See HPI for pertinent positives & negatives. A total of 10 systems reviewed and 

were otherwise negative.





Past Medical & Surgical


Medical Problems:


(1) Acute pancreatitis


(2) Bullous pemphigoid


(3) Clostridium difficile colitis


(4) Diverticulosis Colon (W/O Ment Of Hemorrhage)


(5) Nontox Multinodul Goiter


(6) Right Hip DJD








Family History





No pertinent family history





Social History


Smoking Status:  Never Smoker


Smokeless Tobacco Use:  No


Marital Status:  


Housing Status:  lives with family





Current/Historical Medications


Scheduled


Albuterol Hfa (Ventolin Hfa), 2-4 PUFFS INH Q6H


Pancrelipase (Lipase-Protease- (Creon 66885), 1 CAP PO WM


Prednisone (Prednisone Tab), 40 MG PO DAILY


Rizatriptan Benzoate (Maxalt), 10 MG PO PRN





Scheduled PRN


Docusate Sodium (Docusate Sodium), 1 CAP PO QAM PRN for Constipation





Allergies


Coded Allergies:  


     Clindamycin (Verified  Adverse Reaction, Severe, C-DIFF, 12/9/17)


     Pregabalin (Verified  Adverse Reaction, Intermediate, Hallucinations, 12/9/ 17)


     Doxycycline (Unverified  Adverse Reaction, Unknown, DEVELOPED PANCREATITIS

, 12/9/17)





Physical Exam


Vital Signs











  Date Time  Temp Pulse Resp B/P (MAP) Pulse Ox O2 Delivery O2 Flow Rate FiO2


 


12/9/17 19:03 36.8 89 21 114/73 96   


 


12/9/17 18:37  89 21  96   


 


12/9/17 18:30    114/73    


 


12/9/17 18:07  89 23  97   


 


12/9/17 18:00    127/71    


 


12/9/17 17:52  88 20 117/62 97 Room Air  


 


12/9/17 17:43  92      


 


12/9/17 17:41    117/62    


 


12/9/17 17:30     95 Room Air  


 


12/9/17 17:30     95 Room Air  


 


12/9/17 16:14     95 Room Air  


 


12/9/17 16:12 36.8 104 20 162/79 93 Room Air  











Physical Exam


GENERAL:  Patient is awake, alert, and in no acute distress. Patient is resting 

comfortably and showing no signs of anxiety


EYES: The conjunctivae are clear.  The pupils are round and reactive. 


EARS, NOSE, MOUTH AND THROAT: The nose is without any evidence of any 

deformity. Mucous membranes are moist tongue is midline 


NECK: The neck is nontender and supple.


RESPIRATORY: Respiratory rate is normal, expiratory wheezing noted, no 

tachypnea or conversation dyspnea noted. 


CARDIOVASCULAR:  Regular rate and rhythm noted there no murmurs rubs or gallops 

normal S1 normal S2 


GASTROINTESTINAL: The abdomen is soft. Bowel sounds are present in all 

quadrants. Abdomen is nontender





MUSCULOSKELETAL/EXTREMITIES: There is no evidence of gross deformity full range 

of motion is noted in the hips and shoulders


SKIN: There is no obvious evidence of any rash. There are no petechiae, pallor 

or cyanosis noted. 


NEUROLOGIC:  Patient is awake alert and oriented x3





Medical Decision & Procedures


ER Provider


Diagnostic Interpretation:


Radiology results as stated below per my review and radiologist interpretation:








CHEST ONE VIEW PORTABLE





FINDINGS: Lung volumes are normal. No consolidation is identified and there is


no evidence of pulmonary edema. Cardiomediastinal silhouette is normal. There is


no pneumothorax or pleural effusion. 





IMPRESSION:  No acute cardiopulmonary findings. 








Electronically signed by:  Kyler Schneider M.D.





Laboratory Results


12/9/17 17:30








Red Blood Count 4.79, Mean Corpuscular Volume 98.3, Mean Corpuscular Hemoglobin 

32.2, Mean Corpuscular Hemoglobin Concent 32.7, Mean Platelet Volume 10.2, 

Neutrophils (%) (Auto) 61.0, Lymphocytes (%) (Auto) 21.1, Monocytes (%) (Auto) 

5.5, Eosinophils (%) (Auto) 11.5, Basophils (%) (Auto) 0.8, Neutrophils # (Auto

) 4.34, Lymphocytes # (Auto) 1.50, Monocytes # (Auto) 0.39, Eosinophils # (Auto

) 0.82, Basophils # (Auto) 0.06





12/9/17 17:30

















Test


  12/9/17


17:30 12/9/17


17:46


 


White Blood Count


  7.12 K/uL


(4.8-10.8) 


 


 


Red Blood Count


  4.79 M/uL


(4.2-5.4) 


 


 


Hemoglobin


  15.4 g/dL


(12.0-16.0) 


 


 


Hematocrit 47.1 % (37-47)  


 


Mean Corpuscular Volume


  98.3 fL


() 


 


 


Mean Corpuscular Hemoglobin


  32.2 pg


(25-34) 


 


 


Mean Corpuscular Hemoglobin


Concent 32.7 g/dl


(32-36) 


 


 


Platelet Count


  246 K/uL


(130-400) 


 


 


Mean Platelet Volume


  10.2 fL


(7.4-10.4) 


 


 


Neutrophils (%) (Auto) 61.0 %  


 


Lymphocytes (%) (Auto) 21.1 %  


 


Monocytes (%) (Auto) 5.5 %  


 


Eosinophils (%) (Auto) 11.5 %  


 


Basophils (%) (Auto) 0.8 %  


 


Neutrophils # (Auto)


  4.34 K/uL


(1.4-6.5) 


 


 


Lymphocytes # (Auto)


  1.50 K/uL


(1.2-3.4) 


 


 


Monocytes # (Auto)


  0.39 K/uL


(0.11-0.59) 


 


 


Eosinophils # (Auto)


  0.82 K/uL


(0-0.5) 


 


 


Basophils # (Auto)


  0.06 K/uL


(0-0.2) 


 


 


RDW Standard Deviation


  47.0 fL


(36.4-46.3) 


 


 


RDW Coefficient of Variation


  13.1 %


(11.5-14.5) 


 


 


Immature Granulocyte % (Auto) 0.1 %  


 


Immature Granulocyte # (Auto)


  0.01 K/uL


(0.00-0.02) 


 


 


Prothrombin Time


  10.1 SECONDS


(9.0-12.0) 


 


 


Prothromb Time International


Ratio 1.0 (0.9-1.1) 


  


 


 


Activated Partial


Thromboplast Time 25.2 SECONDS


(21.0-31.0) 


 


 


Partial Thromboplastin Ratio 1.0  


 


Anion Gap


  1.0 mmol/L


(3-11) 


 


 


Est Creatinine Clear Calc


Drug Dose 38.7 ml/min 


  


 


 


Estimated GFR (


American) 64.9 


  


 


 


Estimated GFR (Non-


American 56.0 


  


 


 


BUN/Creatinine Ratio 15.6 (10-20)  


 


Calcium Level


  9.3 mg/dl


(8.5-10.1) 


 


 


Total Bilirubin


  0.3 mg/dl


(0.2-1) 


 


 


Aspartate Amino Transf


(AST/SGOT) 23 U/L (15-37) 


  


 


 


Alanine Aminotransferase


(ALT/SGPT) 28 U/L (12-78) 


  


 


 


Alkaline Phosphatase


  156 U/L


() 


 


 


Troponin I


  < 0.015 ng/ml


(0-0.045) 


 


 


Total Protein


  7.5 gm/dl


(6.4-8.2) 


 


 


Albumin


  3.6 gm/dl


(3.4-5.0) 


 


 


Globulin


  3.9 gm/dl


(2.5-4.0) 


 


 


Albumin/Globulin Ratio 0.9 (0.9-2)  


 


Bedside D-Dimer


  


  371 ng/mlFEU


(0-450)





Laboratory results per my review.





Medications Administered











 Medications


  (Trade)  Dose


 Ordered  Sig/Lissa


 Route  Start Time


 Stop Time Status Last Admin


Dose Admin


 


 Albuterol/


 Ipratropium


  (Duoneb)  3 ml  NOW  STAT


 INH  12/9/17 17:19


 12/9/17 17:20 DC 12/9/17 17:36


3 ML


 


 Methylprednisolone


 Sodium Succinate


  (Solu-Medrol IV)  125 mg  NOW  STAT


 IV  12/9/17 18:29


 12/9/17 18:30 DC 12/9/17 18:54


125 MG


 


 Pantoprazole


 Sodium


  (Protonix Tab)  40 mg  NOW  STAT


 PO  12/9/17 18:29


 12/9/17 18:30 DC 12/9/17 18:54


40 MG











ECG


Indication:  SOB/dyspnea


Rate (beats per minute):  90


Rhythm:  normal sinus


Findings:  no ectopy, other (No acute ST segment abnormality)


Comparison ECG Date:  3/30/17


Change:  no significant change





ED Course


1713: The patient was evaluated in room C2b. A complete history and physical 

examination were performed. 





1719: Ordered Duoneb 3 ml INH.





1826: I updated the patient on her test 





1829: Ordered Protonix Tab 40 mg PO, Solu-Medrol  mg IV. 





1839: Upon reevaluation, the patient is resting comfortably. I discussed the 

results and treatment plan with her. She verbalized agreement of the treatment 

plan. The patient was discharged home.





Medical Decision





Differential diagnosis:


Etiologies such as infections, reactive airway disease, pneumonia, pneumothorax

, COPD, CHF, cardiac ischemia, pulmonary embolism, musculoskeletal, 

gastrointestinal, as well as others were entertained.





Nursing notes reviewed.





The patient is a 77-year-old female who presented to the emergency department 

for evaluation of shortness of breath and dyspnea on exertion. The patient had 

a history and physical exam consistent with a bronchitis or some sort of 

bronchospasm. She had significant wheezing on physical exam. I discussed the 

patient's laboratory and radiographic studies with her. Her d-dimer was 

negative. The patient did not have an EKG consistent with ischemia and her 

initial cardiac markers were negative. The patient was encouraged to rest and 

avoid any strenuous activity. She was encouraged to continue all medications as 

prescribed and call her family doctor to schedule a follow-up appointment. 

Otherwise she was encouraged to return to the emergency department immediately 

if symptoms change worsen or the need arises.





Medication Reconcilliation


Current Medication List:  was personally reviewed by me





Impression





 Primary Impression:  


 Bronchitis


 Additional Impression:  


 Dyspnea





Scribe Attestation


The scribe's documentation has been prepared under my direction and personally 

reviewed by me in its entirety. I confirm that the note above accurately 

reflects all work, treatment, procedures, and medical decision making performed 

by me.





Departure Information


Dispostion


Home / Self-Care





Prescriptions





Prednisone (Prednisone Tab) 20 Mg Tab


40 MG PO DAILY, #10 TAB


   Prov: Fabien Cervantes DO         12/9/17





Referrals


Pete De La Rosa M.D. (PCP)





Forms


HOME CARE DOCUMENTATION FORM,                                                 

               IMPORTANT VISIT INFORMATION, WORK / SCHOOL INSTRUCTIONS





Patient Instructions


Bronchitis Acute, ED Dysuria Uncertain Cause, My [facility] [employer]





Additional Instructions





Rest and avoid any strenuous activity. Continue all medications as prescribed. 

Follow-up with your family doctor soon as possible. Return to the emergency 

department immediately if symptoms change worsen or the need arises. Continue 

to take medication for the stomach such as Prilosec or another over-the-counter 

acid medication. Especially take this medication while your on the steroids.





Problem Qualifiers

## 2017-12-09 NOTE — DIAGNOSTIC IMAGING REPORT
CHEST ONE VIEW PORTABLE



CLINICAL HISTORY: Respiratory distress. Dyspnea.    



COMPARISON STUDY:  Chest radiograph November 30, 2017.



FINDINGS: Lung volumes are normal. No consolidation is identified and there is

no evidence of pulmonary edema. Cardiomediastinal silhouette is normal. There is

no pneumothorax or pleural effusion. 



IMPRESSION:  No acute cardiopulmonary findings. 









Electronically signed by:  Kyler Schneider M.D.

12/9/2017 5:52 PM



Dictated Date/Time:  12/9/2017 5:51 PM

## 2017-12-19 ENCOUNTER — HOSPITAL ENCOUNTER (OUTPATIENT)
Dept: HOSPITAL 45 - C.RAD1850 | Age: 77
Discharge: HOME | End: 2017-12-19
Attending: INTERNAL MEDICINE
Payer: COMMERCIAL

## 2017-12-19 DIAGNOSIS — E78.00: Primary | ICD-10-CM

## 2017-12-19 NOTE — DIAGNOSTIC IMAGING REPORT
SINUSES MIN 3 VIEWS ROUTINE



CLINICAL HISTORY: 77 years-old Female presenting with HYPERCHOLESTEROLEMIA. 



TECHNIQUE: 5 views of the sinuses were obtained. 



COMPARISON: Correlation made to head CT from 3/30/2017.



FINDINGS:

Paranasal sinuses and mastoid air cells are grossly clear. Congenital hypoplasia

of the right frontal sinus. Bony nasal septum is midline. Bony orbits intact.



IMPRESSION:

Paranasal sinuses clear.







Electronically signed by:  Paulino Rodríguez M.D.

12/19/2017 12:14 PM



Dictated Date/Time:  12/19/2017 12:13 PM

## 2018-03-05 ENCOUNTER — HOSPITAL ENCOUNTER (OUTPATIENT)
Dept: HOSPITAL 45 - C.MAMM | Age: 78
Discharge: HOME | End: 2018-03-05
Attending: OBSTETRICS & GYNECOLOGY
Payer: COMMERCIAL

## 2018-03-05 DIAGNOSIS — Z12.31: Primary | ICD-10-CM

## 2018-03-05 NOTE — MAMMOGRAPHY REPORT
BILATERAL DIGITAL SCREENING MAMMOGRAM TOMOSYNTHESIS WITH CAD: 3/5/2018

CLINICAL HISTORY: Routine screening.  Patient has no complaints.  





TECHNIQUE:  Breast tomosynthesis in addition to standard 2D mammography was performed. Current study 
was also evaluated with a Computer Aided Detection (CAD) system.  



COMPARISON: Comparison is made to exams dated:  3/2/2017 mammogram, 2/29/2016 mammogram, 2/27/2015 ma
mmogram, 3/6/2014 mammogram, 2/26/2014 mammogram, and 2/25/2013 mammogram - [facility]
ter.   



BREAST COMPOSITION:  There are scattered areas of fibroglandular density in both breasts.  



FINDINGS:  The parenchymal pattern is unchanged. No developing mass, architectural distortion or clus
ter of suspicious microcalcifications is seen in either breast.  





IMPRESSION:  ACR BI-RADS CATEGORY 2: BENIGN

There is no mammographic evidence of malignancy. A 1 year screening mammogram is recommended.  The pa
tient will receive written notification of the results.  





Approximately 10% of breast cancers are not detected with mammography. A negative mammographic report
 should not delay biopsy if a clinically suggestive mass is present.



Mindy Santana M.D.          

ay/:3/5/2018 13:46:06  



Imaging Technologist: Leah HAMEED(ZEN)(ASHA)(BD), [facility]

letter sent: Normal 1/2  

BI-RADS Code: ACR BI-RADS Category 2: Benign